# Patient Record
Sex: FEMALE
[De-identification: names, ages, dates, MRNs, and addresses within clinical notes are randomized per-mention and may not be internally consistent; named-entity substitution may affect disease eponyms.]

---

## 2017-03-25 ENCOUNTER — HOSPITAL ENCOUNTER (EMERGENCY)
Dept: HOSPITAL 14 - H.ER | Age: 37
Discharge: HOME | End: 2017-03-25
Payer: COMMERCIAL

## 2017-03-25 VITALS
OXYGEN SATURATION: 100 % | DIASTOLIC BLOOD PRESSURE: 86 MMHG | HEART RATE: 84 BPM | SYSTOLIC BLOOD PRESSURE: 117 MMHG | TEMPERATURE: 98 F | RESPIRATION RATE: 16 BRPM

## 2017-03-25 VITALS — BODY MASS INDEX: 28.3 KG/M2

## 2017-03-25 DIAGNOSIS — J02.9: Primary | ICD-10-CM

## 2017-03-25 NOTE — ED PDOC
HPI: CCC, URI, Sore Throat


Time Seen by Provider: 03/25/17 14:13


Chief Complaint (Nursing): ENT Problem


Chief Complaint (Provider): sore throat


History Per: Patient


History/Exam Limitations: no limitations


Have you had recent travel within the past 21 days to any of the following 

countries: [place], [place], [place] or [place]?: No


Onset/Duration Of Symptoms: Days (x 4)


Current Symptoms Are (Timing): Still Present


Associated Symptoms: Sore Throat.  denies: Fever, Chills, Cough, Sputum


Additional Complaint(s): 


Caitlin Pruitt is a 37 year old female, with no previous medical history, who 

presents to the ED with complaints of a sore throat ongoing for 4 days. Pt 

denies any cough, fever, body aches, chills, sick contact, sputum production or 

recent travel. Pt denies any additional complaints at this time. Pt is 

requesting to be testing for strep. 





PMD: none provided





Past Medical History


Vital Signs: 


 Last Vital Signs











Temp  98.0 F   03/25/17 14:06


 


Pulse  84   03/25/17 14:06


 


Resp  16   03/25/17 14:06


 


BP  117/86   03/25/17 14:06


 


Pulse Ox  100   03/25/17 14:21














- Family History


Family History: States: Unknown Family Hx





- Home Medications


Home Medications: 


 Ambulatory Orders











 Medication  Instructions  Recorded


 


Nitrofurantoin Macrocrystals 100 mg PO BID #14 cap 12/20/15





[Macrobid]  


 


Prenatal Vitamins6 [Prenatal 1 tab PO DAILY #30 tab 12/20/15





Vitamins]  


 


traMADol [Ultram] 50 - 100 mg PO Q6H #20 tab 05/19/16


 


Benzonatate [Tessalon Perles] 100 mg PO BID #20 sgl 03/25/17














- Allergies


Allergies/Adverse Reactions: 


 Allergies











Allergy/AdvReac Type Severity Reaction Status Date / Time


 


No Known Allergies Allergy   Verified 12/29/16 15:53














Review of Systems


ROS Statement: Except As Marked, All Systems Reviewed And Found Negative


Constitutional: Negative for: Fever, Chills, Other (body aches)


Respiratory: Negative for: Cough, Sputum





Physical Exam





- Reviewed


Nursing Documentation Reviewed: Yes


Vital Signs Reviewed: Yes





- Physical Exam


Appears: Positive for: Well, Non-toxic, No Acute Distress


Head Exam: Positive for: ATRAUMATIC, NORMAL INSPECTION, NORMOCEPHALIC


Skin: Positive for: Normal Color, Warm, DRY


ENT: Positive for: Pharyngeal Erythema, Tonsillar Swelling (moderate), Other (

cervical lymphadenopathy. no uvular deviation ).  Negative for: Tonsillar 

Exudate


Cardiovascular/Chest: Positive for: Regular Rate, Rhythm


Respiratory: Positive for: CNT, Normal Breath Sounds


Neurologic/Psych: Positive for: Alert, Oriented





- ECG


O2 Sat by Pulse Oximetry: 100 (RA)


Pulse Ox Interpretation: Normal





Medical Decision Making


Medical Decision Making: 


initial Impression: Viral vs Bacterial syndrome 





Initial Plan:


* rapid strep 


* reevaluation 


* 


* negative strep


* most likely viral -avi rodriguez for sore thoart





--------------------------------------------------------------------------------

----------------- 


Scribe Attestation:


Documented by Amber Chen, acting as a scribe for Marcelle Morgan PA-C.





Provider Scribe Attestation:


All medical record entries made by the Scribe were at my direction and 

personally dictated by me. I have reviewed the chart and agree that the record 

accurately reflects my personal performance of the history, physical exam, 

medical decision making, and the department course for this patient. I have 

also personally directed, reviewed, and agree with the discharge instructions 

and disposition.





Disposition





- Clinical Impression


Clinical Impression: 


 Sore throat (viral)





- Patient ED Disposition


Is Patient to be Admitted: No


Counseled Patient/Family Regarding: Studies Performed, Diagnosis, Need For 

Followup, Rx Given





- Disposition


Disposition: Routine/Home


Disposition Time: 15:04


Condition: STABLE


Prescriptions: 


Benzonatate [Tessalon Perles] 100 mg PO BID #20 sgl


Instructions:  Strep Throat (ED)

## 2018-02-07 ENCOUNTER — HOSPITAL ENCOUNTER (EMERGENCY)
Dept: HOSPITAL 14 - H.ER | Age: 38
Discharge: HOME | End: 2018-02-07
Payer: COMMERCIAL

## 2018-02-07 VITALS
SYSTOLIC BLOOD PRESSURE: 122 MMHG | OXYGEN SATURATION: 100 % | DIASTOLIC BLOOD PRESSURE: 81 MMHG | HEART RATE: 67 BPM | RESPIRATION RATE: 20 BRPM

## 2018-02-07 VITALS — TEMPERATURE: 98.8 F

## 2018-02-07 VITALS — BODY MASS INDEX: 29.2 KG/M2

## 2018-02-07 DIAGNOSIS — J02.9: ICD-10-CM

## 2018-02-07 DIAGNOSIS — N93.9: Primary | ICD-10-CM

## 2018-02-07 LAB
BASOPHILS # BLD AUTO: 0 K/UL (ref 0–0.2)
BASOPHILS NFR BLD: 0.4 % (ref 0–2)
EOSINOPHIL # BLD AUTO: 0.1 K/UL (ref 0–0.7)
EOSINOPHIL NFR BLD: 1.4 % (ref 0–4)
ERYTHROCYTE [DISTWIDTH] IN BLOOD BY AUTOMATED COUNT: 13 % (ref 11.5–14.5)
HGB BLD-MCNC: 11.9 G/DL (ref 12–16)
LYMPHOCYTES # BLD AUTO: 1.6 K/UL (ref 1–4.3)
LYMPHOCYTES NFR BLD AUTO: 22 % (ref 20–40)
MCH RBC QN AUTO: 27.4 PG (ref 27–31)
MCHC RBC AUTO-ENTMCNC: 33.1 G/DL (ref 33–37)
MCV RBC AUTO: 82.9 FL (ref 81–99)
MONOCYTES # BLD: 0.4 K/UL (ref 0–0.8)
MONOCYTES NFR BLD: 5.6 % (ref 0–10)
NEUTROPHILS # BLD: 5.1 K/UL (ref 1.8–7)
NEUTROPHILS NFR BLD AUTO: 70.6 % (ref 50–75)
NRBC BLD AUTO-RTO: 0.1 % (ref 0–0)
PLATELET # BLD: 177 K/UL (ref 130–400)
PMV BLD AUTO: 9.5 FL (ref 7.2–11.7)
RBC # BLD AUTO: 4.32 MIL/UL (ref 3.8–5.2)
WBC # BLD AUTO: 7.2 K/UL (ref 4.8–10.8)

## 2018-02-07 NOTE — ED PDOC
HPI: General Adult


Time Seen by Provider: 18 10:17


Chief Complaint (Nursing): Female Genitourinary


Chief Complaint (Provider): sore throat, vaginal bleeding


History Per: Patient


Additional Complaint(s): 


37-year-old female presents to emergency department with body aches and sore 

throat that started yesterday. Patient states multiple family members were 

diagnosed with flu recently.  Patient denies any fever. She also complains of 

persistent vaginal bleeding since 2018. Patient states she has her normal 

menses from 18 to 18 and then started to bleed again on 18 to 

presents.  Patient is on depo provera as of 9 months ago.  She denies concern 

for pregnancy. Patient states she's been taking Motrin for menstrual cramping 

which has helped.





PMD: Dr. Estes





Past Medical History


Reviewed: Historical Data, Nursing Documentation, Vital Signs


Vital Signs: 


 Last Vital Signs











Temp  98.8 F   18 10:17


 


Pulse  70   18 10:17


 


Resp  16   18 10:17


 


BP  154/95 H  18 10:17


 


Pulse Ox  99   18 12:27














- Medical History


PMH: No Chronic Diseases





- Surgical History


Other surgeries: left knee surgery





- Family History


Family History: States: No Known Family Hx





- Living Arrangements


Living Arrangements: With Family





- Social History


Current smoker - smoking cessation education provided: No


Ex-Smoker (has not smoked in the last 12 months): Yes


Alcohol: Social


Drugs: Denies





- Home Medications


Home Medications: 


 Ambulatory Orders











 Medication  Instructions  Recorded


 


Nitrofurantoin Macrocrystals 100 mg PO BID #14 cap 12/20/15





[Macrobid]  


 


Prenatal Vitamins6 [Prenatal 1 tab PO DAILY #30 tab 12/20/15





Vitamins]  


 


traMADol [Ultram] 50 - 100 mg PO Q6H #20 tab 16


 


Benzonatate [Tessalon Perles] 100 mg PO BID #20 sgl 17


 


Ibuprofen [Motrin] 600 mg PO TID 7 Days  tab 10/27/17


 


Nitrofurantoin Macrocrystals 100 mg PO BID #10 cap 10/27/17





[Macrobid]  














- Allergies


Allergies/Adverse Reactions: 


 Allergies











Allergy/AdvReac Type Severity Reaction Status Date / Time


 


No Known Allergies Allergy   Verified 16 15:53














Review of Systems


ROS Statement: Except As Marked, All Systems Reviewed And Found Negative


Constitutional: Positive for: Other (body aches).  Negative for: Fever


ENT: Positive for: Throat Pain


Cardiovascular: Negative for: Chest Pain


Respiratory: Negative for: Cough


Gastrointestinal: Positive for: Abdominal Pain (cramping).  Negative for: Nausea

, Vomiting, Diarrhea


Genitourinary Female: Positive for: Vaginal Bleeding.  Negative for: Dysuria





Physical Exam





- Reviewed


Nursing Documentation Reviewed: Yes


Vital Signs Reviewed: Yes





- Physical Exam


Appears: Positive for: Well, Non-toxic, No Acute Distress


Skin: Negative for: Rash


Eye Exam: Positive for: Normal appearance


Cardiovascular/Chest: Positive for: Regular Rate, Rhythm


Respiratory: Positive for: Normal Breath Sounds


Gastrointestinal/Abdominal: Positive for: Soft.  Negative for: Tenderness, 

Distended, Guarding, Rebound


Back: Negative for: L CVA Tenderness, R CVA Tenderness


Extremity: Positive for: Normal ROM


Neurologic/Psych: Positive for: Alert, Oriented





- Laboratory Results


Result Diagrams: 


 18 11:45





Urine Pregnancy POC: Negative


Urine dip results: Positive for: Blood (large), Protein.  Negative for: 

Leukocyte Esterase, Nitrate, Ketones, Glucose, Bilirubin





- ECG


O2 Sat by Pulse Oximetry: 99


Pulse Ox Interpretation: Normal





Medical Decision Making


Medical Decision Makin year old with flu like symptoms and vaginal bleeding





Plan:


Flu swab


PO tylenol


Urine dip and pregnancy test





Flu and strep are negative


Advised NSAID's prn sore throat and cramps and follow up with ob/gyn.





Disposition





- Clinical Impression


Clinical Impression: 


 Sore throat, Vaginal bleeding








- Patient ED Disposition


Is Patient to be Admitted: No


Counseled Patient/Family Regarding: Studies Performed, Diagnosis, Need For 

Followup





- Disposition


Referrals: 


Tommy Hua MD [Staff Provider] - 


Disposition: Routine/Home


Disposition Time: 12:59


Condition: STABLE


Additional Instructions: 


Motrin as needed for pain.


Follow up in 1-2 days with gyn.


Instructions:  Dysfunctional Uterine Bleeding (ED), Pharyngitis (ED)


Forms:  [facility] (English)





Results





- Lab Results


Lab Results: 














  18





  11:45 11:45 11:45


 


WBC    7.2


 


RBC    4.32


 


Hgb    11.9 L


 


Hct    35.8


 


MCV    82.9


 


MCH    27.4


 


MCHC    33.1


 


RDW    13.0


 


Plt Count    177


 


MPV    9.5


 


Neut % (Auto)    70.6


 


Lymph % (Auto)    22.0


 


Mono % (Auto)    5.6


 


Eos % (Auto)    1.4


 


Baso % (Auto)    0.4


 


Neut # (Auto)    5.1


 


Lymph # (Auto)    1.6


 


Mono # (Auto)    0.4


 


Eos # (Auto)    0.1


 


Baso # (Auto)    0.0


 


Influenza Typ A,B (EIA)   Negative for flu a/b 


 


Grp A Beta Strep Ag  Negative

## 2018-02-20 ENCOUNTER — HOSPITAL ENCOUNTER (EMERGENCY)
Dept: HOSPITAL 14 - H.ER | Age: 38
Discharge: HOME | End: 2018-02-20
Payer: COMMERCIAL

## 2018-02-20 VITALS
OXYGEN SATURATION: 100 % | HEART RATE: 88 BPM | DIASTOLIC BLOOD PRESSURE: 83 MMHG | SYSTOLIC BLOOD PRESSURE: 133 MMHG | RESPIRATION RATE: 20 BRPM | TEMPERATURE: 99.8 F

## 2018-02-20 VITALS — BODY MASS INDEX: 29.2 KG/M2

## 2018-02-20 DIAGNOSIS — J11.1: Primary | ICD-10-CM

## 2018-02-20 NOTE — ED PDOC
HPI: CCC, URI, Sore Throat


Time Seen by Provider: 02/20/18 18:06


Chief Complaint (Nursing): Cough, Cold, Congestion


Chief Complaint (Provider): Cough, congestion


History Per: Patient


History/Exam Limitations: no limitations


Have you had recent travel within the past 21 days to any of the following 

countries: [place], [place], [place] or [place]?: No


Onset/Duration Of Symptoms: Days


Current Symptoms Are (Timing): Still Present


Additional History Per: Patient


Additional Complaint(s): 


38yo female, presents to ED with complaints of a sore throat for the past 3 

days and a cough since yesterday. She states since this morning, she also has 

had chills, bodyaches and headache. Patient states she has a fever as well with 

a Tmax of 100.8 at home. She offers no other medical complaints. 





Past Medical History


Reviewed: Historical Data, Nursing Documentation, Vital Signs


Vital Signs: 


 Last Vital Signs











Temp  99.8 F H  02/20/18 17:16


 


Pulse  88   02/20/18 17:16


 


Resp  20   02/20/18 17:16


 


BP  133/83   02/20/18 17:16


 


Pulse Ox  100   02/20/18 18:39














- Medical History


PMH: No Chronic Diseases





- Surgical History


Surgical History: No Surg Hx





- Family History


Family History: States: Unknown Family Hx





- Home Medications


Home Medications: 


 Ambulatory Orders











 Medication  Instructions  Recorded


 


Nitrofurantoin Macrocrystals 100 mg PO BID #14 cap 12/20/15





[Macrobid]  


 


Prenatal Vitamins6 [Prenatal 1 tab PO DAILY #30 tab 12/20/15





Vitamins]  


 


traMADol [Ultram] 50 - 100 mg PO Q6H #20 tab 05/19/16


 


Benzonatate [Tessalon Perles] 100 mg PO BID #20 sgl 03/25/17


 


Ibuprofen [Motrin] 600 mg PO TID 7 Days  tab 10/27/17


 


Nitrofurantoin Macrocrystals 100 mg PO BID #10 cap 10/27/17





[Macrobid]  


 


Oseltamivir [Tamiflu] 75 mg PO BID #10 cap 02/20/18


 


Prednisone 50 mg PO ONCE #1 tablet 02/20/18














- Allergies


Allergies/Adverse Reactions: 


 Allergies











Allergy/AdvReac Type Severity Reaction Status Date / Time


 


No Known Allergies Allergy   Verified 02/20/18 17:15














Review of Systems


ROS Statement: Except As Marked, All Systems Reviewed And Found Negative


Constitutional: Positive for: Fever, Chills, Malaise


ENT: Positive for: Nose Congestion, Throat Pain


Respiratory: Positive for: Cough


Neurological: Positive for: Headache





Physical Exam





- Reviewed


Nursing Documentation Reviewed: Yes


Vital Signs Reviewed: Yes





- Physical Exam


Appears: Positive for: Non-toxic, No Acute Distress


Head Exam: Positive for: ATRAUMATIC


Skin: Positive for: Normal Color


Eye Exam: Positive for: Normal appearance


ENT: Positive for: Normal ENT Inspection.  Negative for: Pharyngeal Erythema, 

Tonsillar Exudate, Tonsillar Swelling


Neck: Positive for: Painless ROM, Supple


Cardiovascular/Chest: Positive for: Regular Rate, Rhythm


Respiratory: Positive for: Normal Breath Sounds.  Negative for: Wheezing


Neurologic/Psych: Positive for: Alert, Oriented.  Negative for: Motor/Sensory 

Deficits





- ECG


O2 Sat by Pulse Oximetry: 100 (RA)


Pulse Ox Interpretation: Normal





Medical Decision Making


Medical Decision Making: 


Plan:


-- Patient to be treated for influenza, given prescriptions for Tamiflu and 

prednisone. Patient instructed to take medication as instructed, keep well 

hydrated and to follow up with PCP in 1-2 days. 





--------------------------------------------------------------------------------

-----------------


Scribe Attestation:   


Documented by Vanessa Calderón, acting as a scribe for Lakisha Rhodes PA-C





Provider Scribe Attestation:


All medical record entries made by the Scribe were at my direction and 

personally dictated by me. I have reviewed the chart and agree that the record 

accurately reflects my personal performance of the history, physical exam, 

medical decision making, and the department course for this patient. I have 

also personally directed, reviewed, and agree with the discharge instructions 

and disposition.





Disposition





- Clinical Impression


Clinical Impression: 


 Influenza








- Patient ED Disposition


Is Patient to be Admitted: No


Counseled Patient/Family Regarding: Diagnosis, Need For Followup, Rx Given





- Disposition


Disposition: Routine/Home


Disposition Time: 18:38


Condition: GOOD


Prescriptions: 


Oseltamivir [Tamiflu] 75 mg PO BID #10 cap


Prednisone 50 mg PO ONCE #1 tablet


Instructions:  Flu, Adult (DC)


Forms:  [facility] (English)

## 2018-03-23 ENCOUNTER — HOSPITAL ENCOUNTER (OUTPATIENT)
Dept: HOSPITAL 14 - H.OPSURG | Age: 38
Discharge: HOME | End: 2018-03-23
Attending: SPECIALIST
Payer: COMMERCIAL

## 2018-03-23 VITALS — RESPIRATION RATE: 18 BRPM

## 2018-03-23 VITALS
SYSTOLIC BLOOD PRESSURE: 122 MMHG | DIASTOLIC BLOOD PRESSURE: 85 MMHG | OXYGEN SATURATION: 100 % | TEMPERATURE: 98 F | HEART RATE: 80 BPM

## 2018-03-23 VITALS — BODY MASS INDEX: 30 KG/M2

## 2018-03-23 DIAGNOSIS — N80.0: Primary | ICD-10-CM

## 2018-03-23 DIAGNOSIS — Z87.891: ICD-10-CM

## 2018-03-23 DIAGNOSIS — E66.9: ICD-10-CM

## 2018-03-23 DIAGNOSIS — R10.2: ICD-10-CM

## 2018-03-23 LAB
APTT BLD: 31.2 SECONDS (ref 25.6–37.1)
ERYTHROCYTE [DISTWIDTH] IN BLOOD BY AUTOMATED COUNT: 13.6 % (ref 11.5–14.5)
HGB BLD-MCNC: 12.3 G/DL (ref 12–16)
INR PPP: 1 (ref 0.9–1.2)
MCH RBC QN AUTO: 26.7 PG (ref 27–31)
MCHC RBC AUTO-ENTMCNC: 32.2 G/DL (ref 33–37)
MCV RBC AUTO: 82.9 FL (ref 81–99)
PLATELET # BLD: 187 K/UL (ref 130–400)
PROTHROMBIN TIME: 11.6 SECONDS (ref 9.8–13.1)
RBC # BLD AUTO: 4.61 MIL/UL (ref 3.8–5.2)
WBC # BLD AUTO: 7.2 K/UL (ref 4.8–10.8)

## 2018-03-23 PROCEDURE — 36415 COLL VENOUS BLD VENIPUNCTURE: CPT

## 2018-03-23 PROCEDURE — 85027 COMPLETE CBC AUTOMATED: CPT

## 2018-03-23 PROCEDURE — 49320 DIAG LAPARO SEPARATE PROC: CPT

## 2018-03-23 PROCEDURE — 85610 PROTHROMBIN TIME: CPT

## 2018-03-23 PROCEDURE — 86850 RBC ANTIBODY SCREEN: CPT

## 2018-03-23 PROCEDURE — 85730 THROMBOPLASTIN TIME PARTIAL: CPT

## 2018-03-23 PROCEDURE — 86900 BLOOD TYPING SEROLOGIC ABO: CPT

## 2018-03-23 RX ADMIN — HYDROMORPHONE HYDROCHLORIDE PRN MG: 1 INJECTION, SOLUTION INTRAMUSCULAR; INTRAVENOUS; SUBCUTANEOUS at 14:00

## 2018-03-23 RX ADMIN — HYDROMORPHONE HYDROCHLORIDE PRN MG: 1 INJECTION, SOLUTION INTRAMUSCULAR; INTRAVENOUS; SUBCUTANEOUS at 13:45

## 2018-03-28 NOTE — OP
PROCEDURE DATE:  03/23/2018



PREOPERATIVE DIAGNOSIS:  Pelvic pain.



POSTOPERATIVE DIAGNOSES:  Pelvic pain plus possible endometriosis and _____

adenomyosis with the uterus being mottled.



SURGEON:  Tommy Hua MD



ASSISTANT:  Nikunj Plaza MD



ANESTHESIA ADMINISTERED BY:  Dr. Gaffney.



TYPE OF ANESTHESIA:  General anesthesia.



PROCEDURE:  Diagnostic laparoscopy on the above findings, possible

endometriosis and _____ adenomyosis.



ESTIMATED BLOOD LOSS:  Minimal.



DESCRIPTION OF PROCEDURE:  With the patient in the dorsal lithotomy

position under general anesthesia, the patient was prepped and draped in

the usual sterile manner.  A weighted speculum was placed in the posterior

vagina.  Bladder was entered with straight cath after which the anterior

cervix was grasped with single-tooth tenaculum and dilated.  A HUMI uterine

manipulator was put into place.  We moved to the abdomen where the abdomen

was tented.  Near the umbilicus, Veress needle introduced inflating the

abdomen to about 4 L of CO2.  After this was done, 1 cm incision was made

below the umbilicus, #10 trocar was introduced.  Following this, inflating

the abdomen to about 4 liters, #5 trocar was put into place to the left of

the midline and uterus, tubes and ovaries are visualized.  Uterus appeared

mottled and the left and right tubes are within jhon limits, ovaries are

grossly normal, what appeared to be some area, which appeared to be

endometriotic.  After visualizing this since, the instruments were removed

from the vagina and incisions were closed without any complications with

2-0 chromic and Dermabond.  Instruments were then removed from the vagina

and the abdomen.

























The patient tolerated the procedure well and was in satisfactory condition

and went to the recovery room.  Blood loss was minimal.





__________________________________________

Tommy Hua MD





DD:  03/28/2018 13:31:14

DT:  03/28/2018 14:02:07

Job # [id number]

## 2018-04-27 ENCOUNTER — HOSPITAL ENCOUNTER (OUTPATIENT)
Dept: HOSPITAL 14 - H.OPSURG | Age: 38
Discharge: HOME | End: 2018-04-27
Attending: SPECIALIST
Payer: COMMERCIAL

## 2018-04-27 VITALS — OXYGEN SATURATION: 99 %

## 2018-04-27 VITALS — HEART RATE: 76 BPM | SYSTOLIC BLOOD PRESSURE: 110 MMHG | DIASTOLIC BLOOD PRESSURE: 67 MMHG | TEMPERATURE: 97.9 F

## 2018-04-27 VITALS — BODY MASS INDEX: 29.9 KG/M2

## 2018-04-27 VITALS — RESPIRATION RATE: 18 BRPM

## 2018-04-27 DIAGNOSIS — Z30.2: Primary | ICD-10-CM

## 2018-04-27 DIAGNOSIS — Z64.1: ICD-10-CM

## 2018-04-27 LAB
ERYTHROCYTE [DISTWIDTH] IN BLOOD BY AUTOMATED COUNT: 13.7 % (ref 11.5–14.5)
HGB BLD-MCNC: 12.8 G/DL (ref 12–16)
MCH RBC QN AUTO: 26.2 PG (ref 27–31)
MCHC RBC AUTO-ENTMCNC: 32.5 G/DL (ref 33–37)
MCV RBC AUTO: 80.6 FL (ref 81–99)
PLATELET # BLD: 181 K/UL (ref 130–400)
RBC # BLD AUTO: 4.9 MIL/UL (ref 3.8–5.2)
WBC # BLD AUTO: 7.1 K/UL (ref 4.8–10.8)

## 2018-04-27 PROCEDURE — 86900 BLOOD TYPING SEROLOGIC ABO: CPT

## 2018-04-27 PROCEDURE — 85027 COMPLETE CBC AUTOMATED: CPT

## 2018-04-27 PROCEDURE — 58670 LAPAROSCOPY TUBAL CAUTERY: CPT

## 2018-04-27 PROCEDURE — 86850 RBC ANTIBODY SCREEN: CPT

## 2018-04-27 PROCEDURE — 36415 COLL VENOUS BLD VENIPUNCTURE: CPT

## 2018-05-06 NOTE — OP
PROCEDURE DATE:  04/27/2018



PROCEDURE: Laparoscopic bilateral tubal ligation with cautery



PREOPERATIVE DIAGNOSIS:  Multiparity.



POSTOPERATIVE DIAGNOSIS:  Multiparity.



SURGEON:  Tommy Hua MD



ANESTHESIA ADMINISTERED BY:  Javid Griffith MD



TYPE OF ANESTHESIA:  General anesthesia.



DESCRIPTION OF PROCEDURE:   The patient was prepped and draped in usual

sterile manner.  Following this, a straight cath was used to enter the

bladder.  After this was done, cervix was grasped anteriorly with

single-tooth tenaculum dilated and HUMI uterine manipulator was put into

place and moved to the abdomen where the abdomen was tented and a Veress

needle introduced, about 4 L of CO2 was inflated.  Following this, a small

incision was made below the umbilicus and #10 trocar was introduced,

leaving the sleeve in place.  The laparoscope with camera attached was then

introduced to the abdominopelvic cavity, visualizing the cervix,

visualizing the tubes, ovaries and uterus normal in shape.  After this was

done, #5 trocar was introduced in the midline just 2 cm above the pubic

bone.  After this was done, both tubes were cauterized bilaterally

extensively and maintaining hemostasis.  Following this, the abdomen was

deflated.  All the instruments were removed from the abdominal cavity.  The

incisions were closed with 1 Vicryl and Dermabond maintaining hemostasis. 

After this was done, the instruments were also removed from the vagina

without any complication.  The patient tolerated the procedure well and was

in satisfactory condition on way to recovery room.





__________________________________________

Tommy Hua MD



DD:  05/05/2018 15:09:44

DT:  05/05/2018 16:26:25

Job # [id number]

MTDD

## 2018-08-02 ENCOUNTER — HOSPITAL ENCOUNTER (EMERGENCY)
Dept: HOSPITAL 14 - H.ER | Age: 38
Discharge: HOME | End: 2018-08-02
Payer: MEDICAID

## 2018-08-02 VITALS
TEMPERATURE: 98 F | RESPIRATION RATE: 18 BRPM | HEART RATE: 76 BPM | DIASTOLIC BLOOD PRESSURE: 67 MMHG | SYSTOLIC BLOOD PRESSURE: 122 MMHG

## 2018-08-02 VITALS — OXYGEN SATURATION: 98 %

## 2018-08-02 VITALS — BODY MASS INDEX: 29.9 KG/M2

## 2018-08-02 DIAGNOSIS — L50.0: Primary | ICD-10-CM

## 2018-08-02 PROCEDURE — 96375 TX/PRO/DX INJ NEW DRUG ADDON: CPT

## 2018-08-02 PROCEDURE — 96374 THER/PROPH/DIAG INJ IV PUSH: CPT

## 2018-08-02 PROCEDURE — 81025 URINE PREGNANCY TEST: CPT

## 2018-08-02 PROCEDURE — 99283 EMERGENCY DEPT VISIT LOW MDM: CPT

## 2018-08-02 RX ADMIN — DIPHENHYDRAMINE HYDROCHLORIDE STA MG: 50 INJECTION INTRAMUSCULAR; INTRAVENOUS at 11:09

## 2018-08-02 NOTE — ED PDOC
HPI: Allergic Reaction


Time Seen by Provider: 08/02/18 10:29


Chief Complaint (Nursing): Abnormal Skin Integrity


Chief Complaint (Provider): Abnormal Skin Integrity


History Per: Patient


History/Exam Limitations: no limitations


Onset/Duration Of Symptoms: Hrs


Additional Complaint(s): 


38 years old female presents to the ED for evaluation of diffused red itchy 

rash over her body onset this morning. Patient reports she had no new 

medication or food or change in slopes and motions. Otherwise patient reports (-

) throat swelling, (-) tongue / lip swelling, (-) dyspnea, (-) cough, (-) 

wheezing, (-) abdominal pain, (-) nausea (-) vomiting.  There has been no 

exposure to known allergens. 








PMD: Daniel Bauer





Past Medical History


Reviewed: Historical Data, Nursing Documentation, Vital Signs


Vital Signs: 


 Last Vital Signs











Temp  98.3 F   08/02/18 10:22


 


Pulse  68   08/02/18 10:22


 


Resp  19   08/02/18 10:22


 


BP  124/85   08/02/18 10:22


 


Pulse Ox  98   08/02/18 10:22














- Medical History


PMH: No Chronic Diseases


   Denies: Chronic Kidney Disease





- Surgical History


Surgical History: No Surg Hx





- Family History


Family History: States: Unknown Family Hx





- Social History


Current smoker - smoking cessation education provided: No


Alcohol: Social


Drugs: Denies





- Home Medications


Home Medications: 


 Ambulatory Orders











 Medication  Instructions  Recorded


 


Ibuprofen [Motrin Tab] 800 mg PO Q8 PRN 04/27/18


 


Nitrofurantoin Macrocrystals 100 mg PO BID #14 cap 05/14/18





[Macrobid]  


 


DiphenhydrAMINE [Benadryl] 50 mg PO TID PRN #30 cap 08/02/18


 


Famotidine [Pepcid] 40 mg PO DAILY #20 tablet 08/02/18


 


predniSONE [predniSONE Tab] 40 mg PO DAILY #8 tab 08/02/18














- Allergies


Allergies/Adverse Reactions: 


 Allergies











Allergy/AdvReac Type Severity Reaction Status Date / Time


 


No Known Allergies Allergy   Verified 08/02/18 10:30














Review of Systems


ROS Statement: Except As Marked, All Systems Reviewed And Found Negative


Skin: Positive for: Rash





Physical Exam





- Physical Exam


Comments: 





GENERAL APPEARANCE: Patient is awake, alert, oriented x 3, in no acute distress


SKIN:  Several erythematous urticarial rash to trunk and lower extremity. 

Otherwise (-) excoriations, (-) drainage, (-) crusting of lesions is present. 


HENT: (-) conjunctival injection, (-) chemosis. Oropharynx: clear (-) tongue or 

lip swelling, (-) tonsillar exudates, (-) erythema. Airway: patent (-) stridor, 

(-) hoarseness.  Mucous membranes moist. Nares: Patent (-) rhinorrhea.


NECK: (-) lymphadenopathy, (-) tenderness. 


CARDIOVASCULAR: Normal rate and rhythm.  (-) murmur, (-) gallop. 


CHEST: (-) rales, (-) wheezing, (-) dyspnea, (-) stridor. Breath sounds equal 

bilaterally.


ABDOMEN: Soft. (-) tenderness, (-) distention, (-) HSM. 


NEURO: Mental status: Patient is alert, oriented, and with normal strength and 

tone.





- ECG


O2 Sat by Pulse Oximetry: 98 (RA)


Pulse Ox Interpretation: Normal





- Progress


ED Course And Treament: 





Patient medicated with Solu-Medrol 125 mg IV, Benadryl 50 mg IV and Pepcid 20 

mg IV.





On reevaluation, patient is awake, alert and oriented 3 in no acute distress, 

breathing easy and unlabored. On exam there is noted improvement of the rash.





Advised to follow up with primary care physician in 1-2 days without fail. 

Advised to take medication as prescribed. Return to the emergency room at any 

time for any new or worsening symptoms.





Patient states she fully agrees with and understands discharge instructions. 

States that she agrees with the plan and disposition. Verbalized and repeated 

discharge instructions and plan. I have given the patient opportunity to ask 

any additional questions.





Disposition





- Clinical Impression


Clinical Impression: 


 Urticaria








- Patient ED Disposition


Is Patient to be Admitted: No


Counseled Patient/Family Regarding: Diagnosis, Need For Followup, Rx Given





- Disposition


Disposition: Routine/Home


Disposition Time: 11:50


Condition: STABLE


Additional Instructions: 


Thank you for letting us take care of you today. You were treated for 

urticaria. The emergency medical care you received today was directed at your 

acute symptoms. If you were prescribed any medication, please fill it and take 

as directed. It may take several days for your symptoms to resolve. Return to 

the Emergency Department if your symptoms worsen, do not improve, or if you 

have any other problems.





Please contact your doctor in 2 days for re-evaluation and follow up. Bring any 

paperwork you were given at discharge with you along with any medications you 

are taking to your follow up visit. Our treatment cannot replace ongoing 

medical care by a primary care provider (PCP) outside of the emergency 

department.





Thank you for allowing the [facility] team to be part of your care today.








Prescriptions: 


DiphenhydrAMINE [Benadryl] 50 mg PO TID PRN #30 cap


 PRN Reason: Allergy Symptoms


Famotidine [Pepcid] 40 mg PO DAILY #20 tablet


predniSONE [predniSONE Tab] 40 mg PO DAILY #8 tab


Instructions:  Hives (DC)


Forms:  [email or web address] (English)





Medical Decision Making


Medical Decision Making: 





Time: 1054


Initial Impression: 


Initial Plan:


--Benadryl 50 mg IVP


--Pepid 20 mg IVP


--Medrol





Upon reevaluation patient is alert, awake and oriented x3. Patient is in no 

acute distress, breathing easy and unlabored with no improvement of rash. 

Advised to follow up with primary care physician in 1-2 days without fail. 

Advised to take medication as prescribed. Return to the emergency room at any 

time for any new or worsening symptoms.





Patient states she fully agrees with and understands discharge instructions. 

States that she agrees with the plan and disposition. Verbalized and repeated 

discharge instructions and plan. I have given the patient opportunity to ask 

any additional questions.





--------------------------------------------------------------------------------

-----


Scribe Attestation:


Documented by Madiha Alfaro, acting as a scribe for Marine Mcguire PA-C.





Provider Scribe Attestation:


All medical record entries made by the Scribe were at my direction and 

personally dictated by me. I have reviewed the chart and agree that the record 

accurately reflects my personal performance of the history, physical exam, 

medical decision making, and the department course for this patient. I have 

also personally directed, reviewed, and agree with the discharge instructions 

and disposition.

## 2018-08-08 ENCOUNTER — HOSPITAL ENCOUNTER (OUTPATIENT)
Dept: HOSPITAL 14 - H.ER | Age: 38
Setting detail: OBSERVATION
LOS: 1 days | Discharge: HOME | End: 2018-08-09
Attending: FAMILY MEDICINE | Admitting: FAMILY MEDICINE
Payer: MEDICAID

## 2018-08-08 VITALS — BODY MASS INDEX: 29.9 KG/M2

## 2018-08-08 DIAGNOSIS — R20.0: ICD-10-CM

## 2018-08-08 DIAGNOSIS — R42: Primary | ICD-10-CM

## 2018-08-08 DIAGNOSIS — Z98.51: ICD-10-CM

## 2018-08-08 DIAGNOSIS — R51: ICD-10-CM

## 2018-08-08 PROCEDURE — 93005 ELECTROCARDIOGRAM TRACING: CPT

## 2018-08-08 PROCEDURE — 81003 URINALYSIS AUTO W/O SCOPE: CPT

## 2018-08-08 PROCEDURE — 81025 URINE PREGNANCY TEST: CPT

## 2018-08-08 PROCEDURE — 70450 CT HEAD/BRAIN W/O DYE: CPT

## 2018-08-08 PROCEDURE — 71045 X-RAY EXAM CHEST 1 VIEW: CPT

## 2018-08-08 PROCEDURE — 80053 COMPREHEN METABOLIC PANEL: CPT

## 2018-08-08 PROCEDURE — 85025 COMPLETE CBC W/AUTO DIFF WBC: CPT

## 2018-08-08 PROCEDURE — 70551 MRI BRAIN STEM W/O DYE: CPT

## 2018-08-08 PROCEDURE — 70544 MR ANGIOGRAPHY HEAD W/O DYE: CPT

## 2018-08-08 PROCEDURE — 99285 EMERGENCY DEPT VISIT HI MDM: CPT

## 2018-08-09 VITALS
OXYGEN SATURATION: 100 % | RESPIRATION RATE: 15 BRPM | SYSTOLIC BLOOD PRESSURE: 129 MMHG | DIASTOLIC BLOOD PRESSURE: 82 MMHG | HEART RATE: 78 BPM

## 2018-08-09 VITALS — TEMPERATURE: 98.2 F

## 2018-08-09 LAB
ALBUMIN SERPL-MCNC: 4.1 G/DL (ref 3.5–5)
ALBUMIN/GLOB SERPL: 1.3 {RATIO} (ref 1–2.1)
ALT SERPL-CCNC: 24 U/L (ref 9–52)
AST SERPL-CCNC: 25 U/L (ref 14–36)
BASOPHILS # BLD AUTO: 0.1 K/UL (ref 0–0.2)
BASOPHILS NFR BLD: 0.8 % (ref 0–2)
BILIRUB UR-MCNC: NEGATIVE MG/DL
BUN SERPL-MCNC: 11 MG/DL (ref 7–17)
CALCIUM SERPL-MCNC: 8.8 MG/DL (ref 8.4–10.2)
COLOR UR: YELLOW
EOSINOPHIL # BLD AUTO: 0.1 K/UL (ref 0–0.7)
EOSINOPHIL NFR BLD: 0.4 % (ref 0–4)
ERYTHROCYTE [DISTWIDTH] IN BLOOD BY AUTOMATED COUNT: 14.9 % (ref 11.5–14.5)
GFR NON-AFRICAN AMERICAN: > 60
GLUCOSE UR STRIP-MCNC: (no result) MG/DL
HGB BLD-MCNC: 12.8 G/DL (ref 12–16)
LEUKOCYTE ESTERASE UR-ACNC: (no result) LEU/UL
LYMPHOCYTES # BLD AUTO: 2.7 K/UL (ref 1–4.3)
LYMPHOCYTES NFR BLD AUTO: 17.9 % (ref 20–40)
MCH RBC QN AUTO: 26.9 PG (ref 27–31)
MCHC RBC AUTO-ENTMCNC: 32.6 G/DL (ref 33–37)
MCV RBC AUTO: 82.5 FL (ref 81–99)
MONOCYTES # BLD: 1 K/UL (ref 0–0.8)
MONOCYTES NFR BLD: 6.4 % (ref 0–10)
NEUTROPHILS # BLD: 11.4 K/UL (ref 1.8–7)
NEUTROPHILS NFR BLD AUTO: 74.5 % (ref 50–75)
NRBC BLD AUTO-RTO: 0 % (ref 0–0)
PH UR STRIP: 6 [PH] (ref 5–8)
PLATELET # BLD: 209 K/UL (ref 130–400)
PMV BLD AUTO: 9.3 FL (ref 7.2–11.7)
PROT UR STRIP-MCNC: 30 MG/DL
RBC # BLD AUTO: 4.77 MIL/UL (ref 3.8–5.2)
RBC # UR STRIP: (no result) /UL
SP GR UR STRIP: 1.02 (ref 1–1.03)
URINE CLARITY: (no result)
UROBILINOGEN UR-MCNC: 4 MG/DL (ref 0.2–1)
WBC # BLD AUTO: 15.3 K/UL (ref 4.8–10.8)

## 2018-08-09 NOTE — RAD
Date of service: 



08/09/2018



HISTORY:

admit  



COMPARISON:

07/10/2009. 



FINDINGS:



LUNGS:

The lungs are well inflated and clear. 



PLEURA:

No significant pleural effusion identified, no pneumothorax apparent.



CARDIOVASCULAR:

Normal.



OSSEOUS STRUCTURES:

No significant abnormalities.



VISUALIZED UPPER ABDOMEN:

Normal.



OTHER FINDINGS:

None.



IMPRESSION:

No active pulmonary disease.

## 2018-08-09 NOTE — ED PDOC
HPI: General Adult


Time Seen by Provider: 08/09/18 01:19


Chief Complaint (Nursing): Dizziness/Lightheaded


Chief Complaint (Provider): dizziness


History Per: Patient


History/Exam Limitations: no limitations


Onset/Duration Of Symptoms: Hrs (7)


Current Symptoms Are (Timing): Still Present


Additional Complaint(s): 





39 y/o female presents for evaluation of dizziness x 7 hours.  Patient states 

she noted mild dizziness when she woke up yesterday morning, but reports as of 

19:00 last night the dizziness became more intense and constant, describes as 

feeling "off balance".  Patient states 3 hours later she noted numbness to 

right side of face and blurred vision bilaterally, worse with right eye, and 

tingling to both hands.  Patient denies headache, nausea/vomiting, chest pain, 

shortness of breath, palpitations, changes in bowel movements, urinary 

symptoms.  





NIHSS Stroke Scale





- Date/Time Evaluation Performed


Date Performed: 08/09/18


Time Performed: 01:20


When Was NIHSS Performed: Baseline





- How Severe is the Stroke


Level of Consciousness: 0=Alert


LOC to Questions: 0=Both comments correct


LOC to commands: 0=Obeys both correctly


Best Gaze: 0=Normal


Visual: 0=No visual loss


Facial: 0=Normal


Motor Arm - Left: 0=No drift


Motor Arm - Right: 0=No drift


Motor Leg - Left: 0=No drift


Motor Leg - Right: 0=No drift


Limb Ataxia: 0=Absent


Sensory: 1=Mild to moderate loss


Best Language: 0=No aphasia


Dysarthia: 0=Normal articulation


Extinction & Inattention (Neglect): 0=Normal, no object


Score: 1





Past Medical History


Reviewed: Historical Data, Nursing Documentation, Vital Signs


Vital Signs: 


 Last Vital Signs











Temp  98.6 F   08/09/18 00:03


 


Pulse  81   08/09/18 00:03


 


Resp  17   08/09/18 00:03


 


BP  146/96 H  08/09/18 00:03


 


Pulse Ox  98   08/09/18 05:55














- Medical History


PMH: No Chronic Diseases


   Denies: Chronic Kidney Disease





- Family History


Family History: States: Unknown Family Hx





- Living Arrangements


Living Arrangements: With Family





- Social History


Current smoker - smoking cessation education provided: No


Alcohol: Social


Drugs: Denies





- Home Medications


Home Medications: 


 Ambulatory Orders











 Medication  Instructions  Recorded


 


Ibuprofen [Motrin Tab] 800 mg PO Q8 PRN 04/27/18


 


Nitrofurantoin Macrocrystals 100 mg PO BID #14 cap 05/14/18





[Macrobid]  


 


DiphenhydrAMINE [Benadryl] 50 mg PO TID PRN #30 cap 08/02/18


 


Famotidine [Pepcid] 40 mg PO DAILY #20 tablet 08/02/18


 


predniSONE [predniSONE Tab] 40 mg PO DAILY #8 tab 08/02/18














- Allergies


Allergies/Adverse Reactions: 


 Allergies











Allergy/AdvReac Type Severity Reaction Status Date / Time


 


No Known Allergies Allergy   Verified 08/02/18 10:30














Review of Systems


ROS Statement: Except As Marked, All Systems Reviewed And Found Negative


Neurological: Positive for: Numbness, Dizziness





Physical Exam





- Reviewed


Nursing Documentation Reviewed: Yes


Vital Signs Reviewed: Yes





- Physical Exam


Appears: Positive for: Well, Non-toxic, No Acute Distress


Head Exam: Positive for: ATRAUMATIC, NORMAL INSPECTION, NORMOCEPHALIC


Skin: Positive for: Normal Color


Eye Exam: Positive for: Normal appearance, EOMI, PERRL, Nystagmus (horizontal b/

l)


ENT: Positive for: Normal ENT Inspection


Cardiovascular/Chest: Positive for: Regular Rate, Rhythm


Respiratory: Positive for: Normal Breath Sounds


Gastrointestinal/Abdominal: Positive for: Normal Exam


Back: Positive for: Normal Inspection


Extremity: Positive for: Normal ROM


Neurologic/Psych: Positive for: Alert, Oriented (x3), Motor/Sensory Deficits (

decreased sensation right face, RUE, RLE compared to left).  Negative for: 

Facial Droop





- Laboratory Results


Result Diagrams: 


 08/09/18 02:47





 08/09/18 02:47


Urine Pregnancy POC: Negative


Urine dip results: Positive for: Blood (patient on menses)





- ECG


ECG: Positive for: Viewed By Me (reviewed by ED attending)


ECG Rhythm: Positive for: Sinus Rhythm


O2 Sat by Pulse Oximetry: 98





- Radiology


X-Ray: Viewed By Me


X-Ray Interpretation: No Acute Disease





- Progress


ED Course And Treament: 


labs, ekg, urine, CT head, IV fluids





WBC elevated; patient states she just finished 5 day course of Prednisone for 

urticarial rash





EXAM: CT Head Without Intravenous Contrast


CLINICAL HISTORY: 38 years old, female; Signs and symptoms; Dizziness; 

Additional info:


Dizziness, numbness


TECHNIQUE: Axial computed tomography images of the head/brain without 

intravenous contrast.


All CT scans at this facility use at least one of these dose optimization 

techniques: automated


exposure control; mA and/or kV adjustment per patient size (includes targeted 

exams where dose is


matched to clinical indication); or iterative reconstruction. Coronal and 

sagittal reformatted images


were created and reviewed.


COMPARISON: CT - HEAD^HEAD_ROUTINE (ADULT) 2009-07-10 17:59


FINDINGS:


Brain: Unremarkable. No hemorrhage. No significant white matter disease. No 

edema.


Ventricles: Unremarkable. No ventriculomegaly.


Bones/joints: Unremarkable. No acute fracture.


Soft tissues: Unremarkable.


Sinuses: Unremarkable as visualized. No acute sinusitis.


Mastoid air cells: Unremarkable as visualized. No mastoid effusion.


IMPRESSION:


Normal head/brain CT.





Case discussed with Dr. Cronin, Neurologist on-call; recommends admit for MRI/MRA


Case discussed with Dr. Mata, medical service on-call, for placement in 

observation telemetry











Disposition





- Clinical Impression


Clinical Impression: 


 Dizziness, Paresthesias








- Disposition


Disposition Time: 05:23


Condition: FAIR

## 2018-08-09 NOTE — CP.PCM.HP
History of Present Illness





- History of Present Illness


History of Present Illness: 





This is a 37 y/o female admitted for numbness of the right side of the face for 

few days followed by tingling and numbness of the right upper extremity. It was 

accompanied by dizziness.


Patient denies having any episode of the same symptoms in the past.


 Denies any headaches. 





Present on Admission





- Present on Admission


Any Indicators Present on Admission: No


History of DVT/PE: No


History of Uncontrolled Diabetes: No


Urinary Catheter: No


Decubitus Ulcer Present: No





Past Patient History





- Infectious Disease


Hx of Infectious Diseases: None





- Past Medical History & Family History


Past Medical History?: Yes





- Past Social History


Alcohol: Social


Drugs: Denies





- CARDIAC


Hx Cardiac Disorders: No





- PULMONARY


Hx Respiratory Disorders: No





- NEUROLOGICAL


Hx Neurological Disorder: No





- HEENT


Hx HEENT Problems: No





- RENAL


Hx Chronic Kidney Disease: No





- ENDOCRINE/METABOLIC


Hx Endocrine Disorders: No





- HEMATOLOGICAL/ONCOLOGICAL


Hx Blood Disorders: No





- INTEGUMENTARY


Hx Dermatological Problems: No





- MUSCULOSKELETAL/RHEUMATOLOGICAL


Hx Musculoskeletal Disorders: No





- GASTROINTESTINAL


Hx Gastrointestinal Disorders: No





- GENITOURINARY/GYNECOLOGICAL


Hx Genitourinary Disorders: No


Other/Comment: Placenta previa x 2





- PSYCHIATRIC


Hx Emotional Abuse: No


Hx Physical Abuse: No


Hx Substance Use: No





- SURGICAL HISTORY


Hx Surgeries: Yes


Hx Arthroscopy: Yes


Hx Orthopedic Surgery: Yes (right knee)


Hx Tubal Ligation: Yes


Other/Comment: laparoscopy





- ANESTHESIA


Hx Anesthesia: Yes


Hx Anesthesia Reactions: No


Hx Malignant Hyperthermia: No





Meds


Home Medications: 


 Home Medication List











 Medication  Instructions  Recorded  Confirmed  Type


 


valACYclovir [Valtrex] 1 gm PO BID #14 tab 08/09/18  Rx











Allergies/Adverse Reactions: 


 Allergies











Allergy/AdvReac Type Severity Reaction Status Date / Time


 


No Known Allergies Allergy   Verified 08/02/18 10:30














Physical Exam





- Eye Exam


Eye Exam: Normal appearance





- ENT Exam


ENT Exam: Mucous Membranes Moist





- Respiratory Exam


Respiratory Exam: Clear to Auscultation Bilateral





- Cardiovascular Exam


Cardiovascular Exam: REGULAR RHYTHM





- GI/Abdominal Exam


GI & Abdominal Exam: Normal Bowel Sounds





- Neurological Exam


Neurological exam: CN II-XII Intact, Oriented x3





- Psychiatric Exam


Psychiatric exam: Normal Mood





Results





- Vital Signs


Recent Vital Signs: 





 Last Vital Signs











Temp  98.2 F   08/09/18 14:18


 


Pulse  78   08/09/18 14:18


 


Resp  15   08/09/18 14:18


 


BP  129/82   08/09/18 14:18


 


Pulse Ox  100   08/09/18 14:18














- Labs


Result Diagrams: 


 08/09/18 02:47





 08/09/18 02:47


Labs: 





 Laboratory Results - last 24 hr











  08/09/18 08/09/18 08/09/18





  02:47 02:47 03:30


 


WBC  15.3 H D  


 


RBC  4.77  


 


Hgb  12.8  


 


Hct  39.4  


 


MCV  82.5  


 


MCH  26.9 L  


 


MCHC  32.6 L  


 


RDW  14.9 H  


 


Plt Count  209  


 


MPV  9.3  


 


Neut % (Auto)  74.5  


 


Lymph % (Auto)  17.9 L  


 


Mono % (Auto)  6.4  


 


Eos % (Auto)  0.4  


 


Baso % (Auto)  0.8  


 


Neut # (Auto)  11.4 H  


 


Lymph # (Auto)  2.7  


 


Mono # (Auto)  1.0 H  


 


Eos # (Auto)  0.1  


 


Baso # (Auto)  0.1  


 


Sodium   138 


 


Potassium   3.5 L 


 


Chloride   103 


 


Carbon Dioxide   25 


 


Anion Gap   14 


 


BUN   11 


 


Creatinine   0.7 


 


Est GFR ( Amer)   > 60 


 


Est GFR (Non-Af Amer)   > 60 


 


Random Glucose   98 


 


Calcium   8.8 


 


Total Bilirubin   0.5 


 


AST   25 


 


ALT   24 


 


Alkaline Phosphatase   86 


 


Total Protein   7.4 


 


Albumin   4.1 


 


Globulin   3.2 


 


Albumin/Globulin Ratio   1.3 


 


Urine Color    Yellow


 


Urine Clarity    Slighty-cloudy


 


Urine pH    6.0


 


Ur Specific Gravity    1.024


 


Urine Protein    30


 


Urine Glucose (UA)    Neg


 


Urine Ketones    Trace


 


Urine Blood    Moderate


 


Urine Nitrate    Negative


 


Urine Bilirubin    Negative


 


Urine Urobilinogen    4.0 H


 


Ur Leukocyte Esterase    Small


 


Urine RBC (Auto)    273 H


 


Urine Microscopic WBC    < 1














Assessment & Plan


(1) Bell's palsy


Status: Acute   





(2) Right facial numbness


Status: Acute   





(3) Dizziness


Status: Acute   





- Assessment and Plan (Free Text)


Plan: 





Musa prince


 neurocheck CT scan/ MRI of [facility] brain


 ASA


may be discharge oonce cleared by Neuro.

## 2018-08-09 NOTE — CARD
--------------- APPROVED REPORT --------------





Date of service: 08/09/2018



<Conclusion>

Sinus rhythm with marked sinus arrhythmia

Otherwise normal ECG

## 2018-08-09 NOTE — CP.PCM.CON
History of Present Illness





- History of Present Illness


History of Present Illness: 





  This is a 38 yr old woman with acute onset dizziness and  mild headache for 

severa days. 





Past Patient History





- Infectious Disease


Hx of Infectious Diseases: None





- Past Medical History & Family History


Past Medical History?: Yes





- Past Social History


Alcohol: Social


Drugs: Denies





- CARDIAC


Hx Cardiac Disorders: No





- PULMONARY


Hx Respiratory Disorders: No





- NEUROLOGICAL


Hx Neurological Disorder: No





- HEENT


Hx HEENT Problems: No





- RENAL


Hx Chronic Kidney Disease: No





- ENDOCRINE/METABOLIC


Hx Endocrine Disorders: No





- HEMATOLOGICAL/ONCOLOGICAL


Hx Blood Disorders: No





- INTEGUMENTARY


Hx Dermatological Problems: No





- MUSCULOSKELETAL/RHEUMATOLOGICAL


Hx Musculoskeletal Disorders: No





- GASTROINTESTINAL


Hx Gastrointestinal Disorders: No





- GENITOURINARY/GYNECOLOGICAL


Hx Genitourinary Disorders: No


Other/Comment: Placenta previa x 2





- PSYCHIATRIC


Hx Emotional Abuse: No


Hx Physical Abuse: No


Hx Substance Use: No





- SURGICAL HISTORY


Hx Surgeries: Yes


Hx Arthroscopy: Yes


Hx Orthopedic Surgery: Yes (right knee)


Hx Tubal Ligation: Yes


Other/Comment: laparoscopy





- ANESTHESIA


Hx Anesthesia: Yes


Hx Anesthesia Reactions: No


Hx Malignant Hyperthermia: No





Meds


Allergies/Adverse Reactions: 


 Allergies











Allergy/AdvReac Type Severity Reaction Status Date / Time


 


No Known Allergies Allergy   Verified 08/02/18 10:30














- Medications


Medications: 


 Current Medications





Aspirin (Aspirin Chewable)  81 mg PO DAILY REYNOLD


   Last Admin: 08/09/18 10:45 Dose:  81 mg











Results





- Vital Signs


Recent Vital Signs: 


 Last Vital Signs











Temp  98.2 F   08/09/18 08:37


 


Pulse  84   08/09/18 12:18


 


Resp  16   08/09/18 12:18


 


BP  133/87   08/09/18 12:18


 


Pulse Ox  98   08/09/18 12:18














- Labs


Result Diagrams: 


 08/09/18 02:47





 08/09/18 02:47


Labs: 


 Laboratory Results - last 24 hr











  08/09/18 08/09/18 08/09/18





  02:47 02:47 03:30


 


WBC  15.3 H D  


 


RBC  4.77  


 


Hgb  12.8  


 


Hct  39.4  


 


MCV  82.5  


 


MCH  26.9 L  


 


MCHC  32.6 L  


 


RDW  14.9 H  


 


Plt Count  209  


 


MPV  9.3  


 


Neut % (Auto)  74.5  


 


Lymph % (Auto)  17.9 L  


 


Mono % (Auto)  6.4  


 


Eos % (Auto)  0.4  


 


Baso % (Auto)  0.8  


 


Neut # (Auto)  11.4 H  


 


Lymph # (Auto)  2.7  


 


Mono # (Auto)  1.0 H  


 


Eos # (Auto)  0.1  


 


Baso # (Auto)  0.1  


 


Sodium   138 


 


Potassium   3.5 L 


 


Chloride   103 


 


Carbon Dioxide   25 


 


Anion Gap   14 


 


BUN   11 


 


Creatinine   0.7 


 


Est GFR ( Amer)   > 60 


 


Est GFR (Non-Af Amer)   > 60 


 


Random Glucose   98 


 


Calcium   8.8 


 


Total Bilirubin   0.5 


 


AST   25 


 


ALT   24 


 


Alkaline Phosphatase   86 


 


Total Protein   7.4 


 


Albumin   4.1 


 


Globulin   3.2 


 


Albumin/Globulin Ratio   1.3 


 


Urine Color    Yellow


 


Urine Clarity    Slighty-cloudy


 


Urine pH    6.0


 


Ur Specific Gravity    1.024


 


Urine Protein    30


 


Urine Glucose (UA)    Neg


 


Urine Ketones    Trace


 


Urine Blood    Moderate


 


Urine Nitrate    Negative


 


Urine Bilirubin    Negative


 


Urine Urobilinogen    4.0 H


 


Ur Leukocyte Esterase    Small


 


Urine RBC (Auto)    273 H


 


Urine Microscopic WBC    < 1

## 2018-08-09 NOTE — CT
Date of service: 



08/09/2018



PROCEDURE:  CT HEAD WITHOUT CONTRAST.



HISTORY:

dizziness, numbness



COMPARISON:

07/10/2009



TECHNIQUE:

Axial computed tomography images were obtained through the head/brain 

without intravenous contrast.  



Radiation dose:



Total exam DLP = 783 mGy-cm.



This CT exam was performed using one or more of the following dose 

reduction techniques: Automated exposure control, adjustment of the 

mA and/or kV according to patient size, and/or use of iterative 

reconstruction technique.



FINDINGS:



HEMORRHAGE:

No intracranial hemorrhage. 



BRAIN:

No mass effect or edema.  No atrophy or chronic microvascular 

ischemic changes.



VENTRICLES:

Unremarkable. No hydrocephalus. 



CALVARIUM:

Unremarkable.



PARANASAL SINUSES:

Unremarkable as visualized. No significant inflammatory changes.



MASTOID AIR CELLS:

Unremarkable as visualized. No inflammatory changes.



OTHER FINDINGS:

The report concurs with the preliminary [facility] report



IMPRESSION:

No acute intracranial finding

## 2018-08-09 NOTE — MRI
Date of service: 



08/09/2018



PROCEDURE:  Magnetic Resonance Angiography Brain



HISTORY:

dizziness, right-sided numbness







COMPARISON:

None available. 



TECHNIQUE:

3D time of flight MR angiography of the intracranial arteries was 

performed. Rotating maximum intensity projection images were 

generated.



FINDINGS:



INTERNAL CAROTID ARTERIES:

Unremarkable. The skull base, petrous, cavernous and supraclinoid 

segments are bilaterally widely patient. 



ANTERIOR CEREBRAL ARTERIES:

Unremarkable. A1 and A2 segments are widely patent. Smaller distal 

branches unremarkable, as visualized.



MIDDLE CEREBRAL ARTERIES:

Unremarkable. M1 and M2 segments are widely patent. Perisylvian 

branches grossly symmetric.



POSTERIOR CIRCULATION:

Basilar Artery: Unremarkable.



Distal Vertebral Arteries: Unremarkable.



Posterior Cerebral Arteries: Unremarkable.



Posterior Inferior Cerebellar Arteries: Unremarkable.



ANEURYSM/ VASCULAR MALFORMATIONS:

None.



OTHER FINDINGS:

None. 



IMPRESSION:

Unremarkable MR angiography of the brain.

## 2018-08-09 NOTE — MRI
Date of service: 



08/09/2018



PROCEDURE:  MRI BRAIN WITHOUT CONTRAST



HISTORY:

dizziness, right-sided numbness



COMPARISON:

Noncontrast head CT 08/09/2018. 



TECHNIQUE:

Multiplanar, multisequence MR images of the brain were obtained 

without intravenous contrast enhancement.



FINDINGS:



HEMORRHAGE:

None



DWI:

No evidence of an acute or early subacute infarction.



BRAIN PARENCHYMA:

Intrinsic signal throughout the gray and white matter structures 

above below the tentorium appears within normal limits including the 

brainstem.  There is no mass effect, parenchymal edema or loss of the 

corticomedullary differentiation. Midline brain anatomy appears 

within normal limits including the corpus callosum, brainstem and 

craniocervical junction. There is no suspicious extra-axial fluid 

collection identified.



VENTRICLES:

Unremarkable. No hydrocephalus.



CRANIUM:

Unremarkable.



ORBITS:

Grossly unremarkable.



PARANASAL SINUSES/MASTOIDS:

Clear



VASCULAR SYSTEM:

Skull base flow voids intact.



OTHER FINDINGS:

None. 



IMPRESSION:

Unremarkable non contrast enhanced MRI of the brain.

## 2018-10-10 ENCOUNTER — HOSPITAL ENCOUNTER (EMERGENCY)
Dept: HOSPITAL 14 - H.ER | Age: 38
Discharge: HOME | End: 2018-10-10
Payer: MEDICAID

## 2018-10-10 VITALS
DIASTOLIC BLOOD PRESSURE: 89 MMHG | HEART RATE: 75 BPM | TEMPERATURE: 98.5 F | SYSTOLIC BLOOD PRESSURE: 138 MMHG | OXYGEN SATURATION: 98 % | RESPIRATION RATE: 16 BRPM

## 2018-10-10 VITALS — BODY MASS INDEX: 29.9 KG/M2
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
## 2018-10-10 DIAGNOSIS — S93.402A: Primary | ICD-10-CM
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
## 2018-10-10 NOTE — ED PDOC
Lower Extremity Pain/Injury


History Per: Patient


Additional Complaint(s): 





Pt. states yesterday she twisted her L ankle and has had progressively worsening

pain to the ankle. Denies numbness, tingling, other injury. 





<Titi Wells - Last Filed: 10/10/18 23:48>





<Amber Alford - Last Filed: 10/12/18 19:38>


Time Seen by Provider: 10/10/18 21:44


Chief Complaint (Nursing): Lower Extremity Problem/Injury





Supervising Attending Note





- Attestation:


I have personally seen and examined this patient.: No


I have reviewed all pertinent clinical information, including history, physical 

exam and plan: Yes





<Amber Alford - Last Filed: 10/12/18 19:38>





Past Medical History


Reviewed: Historical Data, Nursing Documentation, Vital Signs


Vital Signs: 





                                Last Vital Signs











Temp  98.5 F   10/10/18 21:32


 


Pulse  75   10/10/18 21:32


 


Resp  16   10/10/18 21:32


 


BP  138/89   10/10/18 21:32


 


Pulse Ox  98   10/10/18 21:32














- Medical History


PMH: 


   Denies: Chronic Kidney Disease





- Family History


Family History: States: Unknown Family Hx





<Titi Wells - Last Filed: 10/10/18 23:48>


Vital Signs: 





                                Last Vital Signs











Temp  98.5 F   10/10/18 21:32


 


Pulse  75   10/10/18 21:32


 


Resp  16   10/10/18 21:32


 


BP  138/89   10/10/18 21:32


 


Pulse Ox  98   10/10/18 23:50














<Amber Alford - Last Filed: 10/12/18 19:38>





- Home Medications


Home Medications: 


                                Ambulatory Orders











 Medication  Instructions  Recorded


 


DiphenhydrAMINE [Benadryl] 50 mg PO TID PRN #30 cap 08/02/18


 


Famotidine [Pepcid] 40 mg PO DAILY #20 tablet 08/02/18


 


valACYclovir [Valtrex] 1 gm PO BID #14 tab 08/09/18














- Allergies


Allergies/Adverse Reactions: 


                                    Allergies











Allergy/AdvReac Type Severity Reaction Status Date / Time


 


No Known Allergies Allergy   Verified 10/10/18 21:32














Review of Systems


ROS Statement: Except As Marked, All Systems Reviewed And Found Negative





<Titi Wells - Last Filed: 10/10/18 23:48>





Physical Exam





- Physical Exam


Appears: Positive for: Well, Non-toxic, No Acute Distress


Skin: Positive for: Normal Color, Warm.  Negative for: Rash


Eye Exam: Positive for: Normal appearance


Pulses-Dorsalis Pedis (L): 2+


Pulses-Dorsalis Pedis (R): 2+


Extremity: Positive for: Other (L ankle: mild tenderness on L lateral malleolus 

on inferior area without swelling or deformity; no foot, leg, or knee 

tenderness)


Neurologic/Psych: Positive for: Alert, Oriented (x3)





<DaniaconnorTiti - Last Filed: 10/10/18 23:48>





- ECG


O2 Sat by Pulse Oximetry: 98





- Radiology


X-Ray: Interpreted by Me (L ankle xray)


X-Ray Interpretation: No Acute Disease





- Progress


ED Course And Treament: 





Ankle immobilized in ankle aircast splint applied by RN. Crutches provided. 





<RussellTiti NEWTON - Last Filed: 10/10/18 23:48>





Disposition





- Patient ED Disposition


Is Patient to be Admitted: No





- Disposition


Disposition: Routine/Home


Disposition Time: 23:09





<Titi Wells - Last Filed: 10/10/18 23:48>





<Amber Alford - Last Filed: 10/12/18 19:38>





- Clinical Impression


Clinical Impression: 


 Ankle sprain








- Disposition


Referrals: 


[facility] [Outside]


Jules Messina MD [Staff Provider] - 


Ric Mendoza MD [Staff Provider] - 


Condition: IMPROVED


Additional Instructions: 





KIM NOBLE, thank you for letting us take care of you today. Your provider 

was Amber Alford MD and you were treated for LT ANKLE PAIN. The emergency 

medical care you received today was directed at your acute symptoms. If you were

prescribed any medication, please fill it and take as directed. It may take 

several days for your symptoms to resolve. Return to the Emergency Department if

your symptoms worsen, do not improve, or if you have any other problems.





Please contact your doctor or call one of the physicians/clinics you have been 

referred to that are listed on the Patient Visit Information form that is 

included in your discharge packet. Bring any paperwork you were given at 

discharge with you along with any medications you are taking to your follow up 

visit. Our treatment cannot replace ongoing medical care by a primary care 

provider outside of the emergency department.





Thank you for allowing the [facility] ([facility] team to be part of your care today.








If you had an X-Ray or CT scan: A Radiologist will review the ED reading if any 

change in treatment is needed we will contact you.***





If you had a blood, urine, or wound culture: It will take several days for the 

results, if any change in treatment is needed we will contact you.***





If you had an STI test: It will take 48 hours for the results. Please call after

1 week if you have not heard back.***


Instructions:  Ankle Sprain (DC)


Forms:  [employer] (English)


Print Language: ENGLISH

## 2018-10-11 NOTE — RAD
Date of service: 



10/10/2018



PROCEDURE:  Left Ankle Radiographs.



HISTORY:

 trauma 



COMPARISON:

None



FINDINGS:



BONES:

Normal. No fracture. 



JOINTS:

Normal. No osteoarthritis. Ankle mortise maintained. Talar dome intact



SOFT TISSUES:

Normal. 



OTHER FINDINGS:

None.



IMPRESSION:

Normal left ankle radiographs.

## 2018-12-12 ENCOUNTER — HOSPITAL ENCOUNTER (EMERGENCY)
Dept: HOSPITAL 14 - H.ER | Age: 38
Discharge: HOME | End: 2018-12-12
Payer: MEDICAID

## 2018-12-12 VITALS
TEMPERATURE: 98.8 F | DIASTOLIC BLOOD PRESSURE: 88 MMHG | SYSTOLIC BLOOD PRESSURE: 118 MMHG | HEART RATE: 87 BPM | RESPIRATION RATE: 14 BRPM

## 2018-12-12 VITALS — BODY MASS INDEX: 29.9 KG/M2

## 2018-12-12 DIAGNOSIS — J11.1: Primary | ICD-10-CM

## 2018-12-12 NOTE — ED PDOC
History of Present Illness


History of Present Illness: 


Caitlin Pruitt is a 38 year old female with a past medical history of 

hypertension and TIA in July who is presenting to the ED for evaluation of 

cough, nasal congestion, and fevers onset yesterday. Patient states that this 

morning her fever was 101 and took ibuprofen. She also admits to having a sore 

throat with painful swallowing onset yesterday. Patient denies any sick 

contacts, nausea, vomiting, diarrhea, ear pain, or dysuria. 





PMD: Edenilson Bauer








HPI: Influenza


Time Seen by Provider: 12/12/18 19:05


Chief Complaint: Cough, Cold, Congestion


Chief Complaint (Provider): Cough, Cold, Congestion


History Per: Patient


Exam Limitations: no limitations


Onset/Duration Of Symptoms: Days


Symptoms include: fever, sore throat, cough, nasal congestion.  denies: 

vomiting, diarrhea





Past Medical History


Reviewed: Historical Data, Nursing Documentation, Vital Signs


Vital Signs: 


                                Last Vital Signs











Temp  98.7 F   12/12/18 19:00


 


Pulse  83   12/12/18 19:00


 


Resp  18   12/12/18 19:00


 


BP  123/78   12/12/18 19:00


 


Pulse Ox  100   12/12/18 19:00














- Medical History


PMH: HTN, TIA


   Denies: Chronic Kidney Disease





- Surgical History


Other surgeries: orthopedic surgery, tubal ligation





- Family History


Family History: States: Unknown Family Hx





- Social History


Current smoker - smoking cessation education provided: No


Alcohol: None


Drugs: Denies





- Home Medications


Home Medications: 


                                Ambulatory Orders











 Medication  Instructions  Recorded


 


DiphenhydrAMINE [Benadryl] 50 mg PO TID PRN #30 cap 08/02/18


 


Famotidine [Pepcid] 40 mg PO DAILY #20 tablet 08/02/18


 


valACYclovir [Valtrex] 1 gm PO BID #14 tab 08/09/18


 


Acetaminophen [Tylenol] 650 mg PO Q6H PRN 5 Days  capsule 12/12/18


 


Fluticasone Propionate [Flonase] 1 spr BETZY BID PRN 5 Days  bottle 12/12/18


 


Ibuprofen [Motrin Tab] 600 mg PO Q6 PRN 5 Days  tab 12/12/18


 


Promethazine DM [Phenergan DM 5 ml PO Q8 PRN 5 Days  cup 12/12/18





Syrup]  














- Allergies


Allergies/Adverse Reactions: 


                                    Allergies











Allergy/AdvReac Type Severity Reaction Status Date / Time


 


No Known Allergies Allergy   Verified 12/12/18 19:00














Review of Systems


ROS Statement: Except As Marked, All Systems Reviewed And Found Negative


Constitutional: Positive for: Fever


ENT: Positive for: Nose Congestion, Throat Pain.  Negative for: Ear Pain


Respiratory: Positive for: Cough


Gastrointestinal: Negative for: Nausea, Vomiting, Diarrhea


Genitourinary Female: Negative for: Dysuria





Physical Exam





- Reviewed


Nursing Documentation Reviewed: Yes


Vital Signs Reviewed: Yes





- Physical Exam


Appears: Positive for: Non-toxic, No Acute Distress, Uncomfortable


Head Exam: Positive for: ATRAUMATIC, NORMAL INSPECTION, NORMOCEPHALIC


Skin: Positive for: Normal Color, Warm, DRY


Eye Exam: Positive for: Normal appearance.  Negative for: Conjunctival injection


ENT: Positive for: TM Is/Are (normal bilaterally ), Pharyngeal Erythema 

(tonsillar erythema ), Other (No pain to palpation of maxillary or frontal 

sinuses, (+) swollen nasal turbinates ).  Negative for: Tonsillar Exudate


Cardiovascular/Chest: Positive for: Regular Rate, Rhythm.  Negative for: Murmur


Respiratory: Positive for: Normal Breath Sounds.  Negative for: Respiratory 

Distress


Neurologic/Psych: Positive for: Alert, Oriented.  Negative for: Motor/Sensory 

Deficits





Medical Decision Making


Medical Decision Making: 


Time: 20:29


Plan:


--ED urine pregnancy


--Mono


--Rapid Flu


--Rapid strep





------------------------------------------------------------------

-------------------------------


Scribe Attestation:


Documented by Tangela Carmona, acting as a scribe for Lynette Ziegler PA-C. 





Provider Scribe Attestation:


All medical record entries made by the Scribe were at my direction and 

personally dictated by me. I have reviewed the chart and agree that the record 

accurately reflects my personal performance of the history, physical exam, 

medical decision making, and the department course for this patient. I have also

personally directed, reviewed, and agree with the discharge instructions and 

disposition.











- ECG


O2 Sat by Pulse Oximetry: 100





Disposition





- Clinical Impression


Clinical Impression: 


 Influenza-like illness








- Patient ED Disposition


Is Patient to be Admitted: No





- Disposition


Referrals: 


Matthew Ortiz MD [Family Provider] - 


Disposition: Routine/Home


Disposition Time: 21:25


Condition: STABLE


Additional Instructions: 


Take Tylenol or Ibuprofen for fevers/body aches. Return to ER if you develop 

shortness of breath or persistent fevers. 


Prescriptions: 


Acetaminophen [Tylenol] 650 mg PO Q6H PRN 5 Days  capsule


 PRN Reason: Fever >100.4 F


Fluticasone Propionate [Flonase] 1 spr BETZY BID PRN 5 Days  bottle


 PRN Reason: Nasal Congestion


Ibuprofen [Motrin Tab] 600 mg PO Q6 PRN 5 Days  tab


 PRN Reason: Pain, Moderate (4-7)


Promethazine DM [Phenergan DM Syrup] 5 ml PO Q8 PRN 5 Days  cup


 PRN Reason: Cough


Instructions:  Viral Syndrome (DC)


Forms:  [facility] (English)


Print Language: ENGLISH

## 2018-12-13 VITALS — OXYGEN SATURATION: 100 %

## 2018-12-28 ENCOUNTER — HOSPITAL ENCOUNTER (EMERGENCY)
Dept: HOSPITAL 14 - H.ER | Age: 38
Discharge: ANOTHER HOSPITAL | End: 2018-12-28
Payer: MEDICAID

## 2018-12-28 VITALS
TEMPERATURE: 98.2 F | DIASTOLIC BLOOD PRESSURE: 80 MMHG | HEART RATE: 69 BPM | OXYGEN SATURATION: 99 % | SYSTOLIC BLOOD PRESSURE: 117 MMHG

## 2018-12-28 VITALS — BODY MASS INDEX: 29.9 KG/M2

## 2018-12-28 VITALS — RESPIRATION RATE: 18 BRPM

## 2018-12-28 DIAGNOSIS — N39.0: Primary | ICD-10-CM

## 2018-12-28 NOTE — ED PDOC
HPI: Female  Pain


Time Seen by Provider: 12/28/18 15:23


Chief Complaint (Nursing): Female Genitourinary


Chief Complaint (Provider): Female Genitourinary


History Per: Patient


History/Exam Limitations: no limitations


Onset/Duration Of Symptoms: Days


Current Symptoms Are (Timing): Still Present


Quality Of Discomfort: Burning


Associated Symptoms: Urinary Symptoms


Additional Complaint(s): 


37 y/o female with a PMHx of HTN presents to the ED for evaluation of vaginal 

discharge, onset yesterday. Patient states discharge is white/yellow in color 

and associated with dysuria and urinary frequency. Patient additionally 

describes discomfort as itchy. Patient states she is sexually active and does 

not use protection. Otherwise, patient denies any possibility of pregnancy, 

redness, vaginal bleeding, abdominal pain, back pain, nausea, vomiting, diarrhea

and shortness of breath. 





PMD: Edenilson Bauer





Past Medical History


Reviewed: Historical Data, Nursing Documentation, Vital Signs


Vital Signs: 





                                Last Vital Signs











Temp  97.8 F   12/28/18 15:03


 


Pulse  66   12/28/18 15:03


 


Resp  18   12/28/18 15:03


 


BP  117/79   12/28/18 15:03


 


Pulse Ox  100   12/28/18 15:03














- Medical History


PMH: HTN, TIA


   Denies: Chronic Kidney Disease





- Surgical History


Surgical History: No Surg Hx





- Family History


Family History: States: Unknown Family Hx





- Home Medications


Home Medications: 


                                Ambulatory Orders











 Medication  Instructions  Recorded


 


DiphenhydrAMINE [Benadryl] 50 mg PO TID PRN #30 cap 08/02/18


 


Famotidine [Pepcid] 40 mg PO DAILY #20 tablet 08/02/18


 


valACYclovir [Valtrex] 1 gm PO BID #14 tab 08/09/18


 


Acetaminophen [Tylenol] 650 mg PO Q6H PRN 5 Days  capsule 12/12/18


 


Fluticasone Propionate [Flonase] 1 spr BETZY BID PRN 5 Days  bottle 12/12/18


 


Ibuprofen [Motrin Tab] 600 mg PO Q6 PRN 5 Days  tab 12/12/18


 


Promethazine DM [Phenergan DM 5 ml PO Q8 PRN 5 Days  cup 12/12/18





Syrup]  


 


Nitrofurantoin Macrocrystals 100 mg PO BID #10 cap 12/28/18





[Macrobid]  














- Allergies


Allergies/Adverse Reactions: 


                                    Allergies











Allergy/AdvReac Type Severity Reaction Status Date / Time


 


No Known Allergies Allergy   Verified 12/28/18 15:03














Review of Systems


ROS Statement: Except As Marked, All Systems Reviewed And Found Negative


Respiratory: Negative for: Shortness of Breath


Gastrointestinal: Negative for: Nausea, Vomiting, Abdominal Pain, Diarrhea


Genitourinary Female: Positive for: Dysuria, Frequency, Vaginal Discharge.  

Negative for: Vaginal Bleeding, Rash


Musculoskeletal: Negative for: Back Pain





Physical Exam





- Reviewed


Nursing Documentation Reviewed: Yes


Vital Signs Reviewed: Yes





- Physical Exam


Appears: Positive for: No Acute Distress


Head Exam: Positive for: ATRAUMATIC, NORMOCEPHALIC


Skin: Positive for: Normal Color, Warm, Dry


Eye Exam: Positive for: Normal appearance


Neck: Positive for: Normal


Cardiovascular/Chest: Positive for: Regular Rate, Rhythm.  Negative for: Murmur


Respiratory: Positive for: Normal Breath Sounds.  Negative for: Respiratory 

Distress


Gastrointestinal/Abdominal: Positive for: Normal Exam, Soft.  Negative for: 

Tenderness


Pelvic Exam: Positive for: External Exam Normal, No Cerv. Motion Tender, No 

Masses, Discharge (trace whitish dc).  Negative for: Active Bleeding, 

Cervicitis, Lesions, Tender W/Cervical Motion


Back: Positive for: Normal Inspection.  Negative for: L CVA Tenderness, R CVA 

Tenderness


Extremity: Positive for: Normal ROM


Neurologic/Psych: Positive for: Alert, Oriented.  Negative for: Motor/Sensory 

Deficits





- ECG


O2 Sat by Pulse Oximetry: 100 (RA)


Pulse Ox Interpretation: Normal





- Progress


ED Course And Treament: 





1552:  Stable.  AAOx3.  Pain free.  Tolerated PO.  Will tx for uti.  

GC/Chlamydia cx sent.  





Medical Decision Making


Medical Decision Making: 


Time: 1531


Plan:


-- ED Urine Pregnancy


-- ED Urine Dipstick


-- Chlamydia/GC RNA, TMA








 

________________________________________________________________________________


__________


Scribe Attestation:


Documented by Cydney Gustafson, acting as a scribe for Tyrese Phipps MD.





Provider Scribe Attestation:


All medical record entries made by the Scribe were at my direction and 

personally dictated by me. I have reviewed the chart and agree that the record 

accurately reflects my personal performance of the history, physical exam, 

medical decision making, and the department course for this patient. I have also

 personally directed, reviewed, and agree with the discharge instructions and 

disposition.





Disposition





- Clinical Impression


Clinical Impression: 


 UTI (urinary tract infection)








- Patient ED Disposition


Is Patient to be Admitted: No


Counseled Patient/Family Regarding: Studies Performed, Diagnosis, Need For 

Followup, Rx Given





- Disposition


[facility]errals[facility] 


[facility]


Disposition: Routine/Home


Disposition Time: 15:54


Condition: STABLE


Additional Instructions: 


Return if not better in 3 days. 


Prescriptions: 


Nitrofurantoin Macrocrystals [Macrobid] 100 mg PO BID #10 cap


Instructions:  Urinary Tract Infection, Adult (DC)


Forms:  [facility] (English), [facility] ED School/Work Excuse

## 2019-02-06 ENCOUNTER — HOSPITAL ENCOUNTER (EMERGENCY)
Dept: HOSPITAL 14 - H.ER | Age: 39
Discharge: HOME | End: 2019-02-06
Payer: MEDICAID

## 2019-02-06 VITALS — SYSTOLIC BLOOD PRESSURE: 125 MMHG | DIASTOLIC BLOOD PRESSURE: 83 MMHG | RESPIRATION RATE: 18 BRPM

## 2019-02-06 VITALS — OXYGEN SATURATION: 100 % | TEMPERATURE: 98.7 F

## 2019-02-06 VITALS — BODY MASS INDEX: 29.9 KG/M2

## 2019-02-06 DIAGNOSIS — R07.89: Primary | ICD-10-CM

## 2019-02-06 DIAGNOSIS — Z86.73: ICD-10-CM

## 2019-02-06 DIAGNOSIS — I10: ICD-10-CM

## 2019-02-06 LAB
ALBUMIN SERPL-MCNC: 4.6 G/DL (ref 3.5–5)
ALBUMIN/GLOB SERPL: 1.2 {RATIO} (ref 1–2.1)
ALT SERPL-CCNC: 14 U/L (ref 9–52)
APTT BLD: 31.3 SECONDS (ref 25.6–37.1)
AST SERPL-CCNC: 26 U/L (ref 14–36)
BACTERIA #/AREA URNS HPF: (no result) /[HPF]
BASOPHILS # BLD AUTO: 0.1 K/UL (ref 0–0.2)
BASOPHILS NFR BLD: 0.8 % (ref 0–2)
BILIRUB UR-MCNC: NEGATIVE MG/DL
BUN SERPL-MCNC: 13 MG/DL (ref 7–17)
CALCIUM SERPL-MCNC: 9.6 MG/DL (ref 8.4–10.2)
COLOR UR: YELLOW
D DIMER: 211 NG/MLDDU (ref 0–230)
EOSINOPHIL # BLD AUTO: 0.1 K/UL (ref 0–0.7)
EOSINOPHIL NFR BLD: 1 % (ref 0–4)
ERYTHROCYTE [DISTWIDTH] IN BLOOD BY AUTOMATED COUNT: 13.7 % (ref 11.5–14.5)
GFR NON-AFRICAN AMERICAN: > 60
GLUCOSE UR STRIP-MCNC: (no result) MG/DL
HGB BLD-MCNC: 13.5 G/DL (ref 12–16)
INR PPP: 1
LEUKOCYTE ESTERASE UR-ACNC: (no result) LEU/UL
LYMPHOCYTES # BLD AUTO: 2.4 K/UL (ref 1–4.3)
LYMPHOCYTES NFR BLD AUTO: 23.5 % (ref 20–40)
MCH RBC QN AUTO: 26.4 PG (ref 27–31)
MCHC RBC AUTO-ENTMCNC: 32.2 G/DL (ref 33–37)
MCV RBC AUTO: 82 FL (ref 81–99)
MONOCYTES # BLD: 0.6 K/UL (ref 0–0.8)
MONOCYTES NFR BLD: 6.2 % (ref 0–10)
NEUTROPHILS # BLD: 7 K/UL (ref 1.8–7)
NEUTROPHILS NFR BLD AUTO: 68.5 % (ref 50–75)
NRBC BLD AUTO-RTO: 0.1 % (ref 0–0)
PH UR STRIP: 7 [PH] (ref 5–8)
PLATELET # BLD: 201 K/UL (ref 130–400)
PMV BLD AUTO: 9.2 FL (ref 7.2–11.7)
PROT UR STRIP-MCNC: NEGATIVE MG/DL
PROTHROMBIN TIME: 11.3 SECONDS (ref 9.8–13.1)
RBC # BLD AUTO: 5.1 MIL/UL (ref 3.8–5.2)
RBC # UR STRIP: NEGATIVE /UL
SP GR UR STRIP: 1.02 (ref 1–1.03)
SQUAMOUS EPITHIAL: 7 /HPF (ref 0–5)
URINE CLARITY: (no result)
UROBILINOGEN UR-MCNC: (no result) MG/DL (ref 0.2–1)
WBC # BLD AUTO: 10.3 K/UL (ref 4.8–10.8)

## 2019-02-06 NOTE — ED PDOC
HPI: Chest Pain


Time Seen by Provider: 19 15:51


Chief Complaint (Nursing): Chest Pain


Chief Complaint (Provider): Chest Pain


History Per: Patient


History/Exam Limitations: no limitations


Onset/Duration Of Symptoms: Days (x 2), Intermittent Episodes


Current Symptoms Are (Timing): Still Present


Quality: "Pain"


Exacerbating Factors: Deep Breathing


Additional Complaint(s): 


38 year old female with a history of hypertension presents to the ED for 

evaluation of right sided, mid-sternal chest pain radiating around her right 

breast intermittently for the last two days. Patient also complains of 

dizziness. Pain is exacerbated when she takes a deep breath. Denies shortness of

breath, palpitations, anti coagulant use, recent travel, back pain or other 

complaints. 





PMD: Dr. Edenilson Bauer








- Risk Factors


TAD Risk Factors: Pos: Hypertension





Past Medical History


Reviewed: Historical Data, Nursing Documentation, Vital Signs


Vital Signs: 





                                Last Vital Signs











Temp  98.7 F   19 15:22


 


Pulse  80   19 15:22


 


Resp  16   19 15:22


 


BP  147/92 H  19 15:22


 


Pulse Ox  100   19 15:22














- Medical History


PMH: HTN, TIA


   Denies: Chronic Kidney Disease





- Surgical History


Surgical History: No Surg Hx





- Family History


Family History: States: Unknown Family Hx





- Social History


Current smoker - smoking cessation education provided: No


Ex-Smoker (has not smoked in the last 12 months): No





- Home Medications


Home Medications: 


                                Ambulatory Orders











 Medication  Instructions  Recorded


 


DiphenhydrAMINE [Benadryl] 50 mg PO TID PRN #30 cap 18


 


Famotidine [Pepcid] 40 mg PO DAILY #20 tablet 18


 


valACYclovir [Valtrex] 1 gm PO BID #14 tab 18


 


Acetaminophen [Tylenol] 650 mg PO Q6H PRN 5 Days  capsule 18


 


Fluticasone Propionate [Flonase] 1 spr BETZY BID PRN 5 Days  bottle 18


 


Ibuprofen [Motrin Tab] 600 mg PO Q6 PRN 5 Days  tab 18


 


Promethazine DM [Phenergan DM 5 ml PO Q8 PRN 5 Days  cup 18





Syrup]  


 


Nitrofurantoin Macrocrystals 100 mg PO BID #10 cap 18





[Macrobid]  


 


Naproxen [Naprosyn] 500 mg PO BID PRN #15 tablet 19














- Allergies


Allergies/Adverse Reactions: 


                                    Allergies











Allergy/AdvReac Type Severity Reaction Status Date / Time


 


No Known Allergies Allergy   Verified 19 15:22














Wells Criteria for PE





- Wells Criteria for Pulmonary Embolism


Clinical Signs and Symptoms of DVT: No


P.E is #1 Diagnosis, or Equally Likely: No


Heart Rate >100: No


Immobilization at least 3 days;Surgery previous 4 weeks: No


Previous, objectively diagnosed PE or DVT: No


Hemoptysis: No


Malignancy w/treatment within 6 months, or palliative: No


Total Score: 0





Review of Systems


ROS Statement: Except As Marked, All Systems Reviewed And Found Negative


Cardiovascular: Positive for: Chest Pain (right sided midsternal; radiating 

around right breast).  Negative for: Palpitations


Respiratory: Negative for: Cough, Shortness of Breath


Musculoskeletal: Negative for: Neck Pain, Back Pain


Neurological: Positive for: Dizziness





Physical Exam





- Reviewed


Nursing Documentation Reviewed: Yes


Vital Signs Reviewed: Yes





- Physical Exam


Appears: Positive for: Non-toxic, No Acute Distress


Head Exam: Positive for: ATRAUMATIC, NORMAL INSPECTION, NORMOCEPHALIC


Skin: Positive for: Normal Color, Warm, Dry


Eye Exam: Positive for: EOMI, Normal appearance, PERRL


ENT: Positive for: Normal ENT Inspection


Neck: Positive for: Normal, Painless ROM, Supple


Cardiovascular/Chest: Positive for: Regular Rate, Rhythm.  Negative for: Murmur


Respiratory: Positive for: Normal Breath Sounds.  Negative for: Respiratory 

Distress


Gastrointestinal/Abdominal: Positive for: Normal Exam, Soft.  Negative for: 

Tenderness


Back: Positive for: Normal Inspection.  Negative for: L CVA Tenderness, R CVA 

Tenderness


Extremity: Positive for: Normal ROM (x 4).  Negative for: Calf Tenderness, 

Deformity, Swelling


Neurologic/Psych: Positive for: Alert, Oriented (x 3).  Negative for: 

Motor/Sensory Deficits





- Laboratory Results


Result Diagrams: 


                                 19 17:15





                                 19 17:15





- ECG


ECG: Positive for: Interpreted By Me, Viewed By Me


ECG Rhythm: Positive for: Sinus Rhythm (normal).  Negative for: ST/T Changes


Rate: 68


O2 Sat by Pulse Oximetry: 100 (RA)


Pulse Ox Interpretation: Normal





Medical Decision Making


Medical Decision Makin:43 


Impression: right sided chest pain


Initial Plan: 


--EKG


--CMP 


--CBC 


--CXR


--Urine preg


--Urine dip 


--PTT


--PT


--D Dimer 





Accession No. : [id number]


Patient Name / ID : REAL ALVAREZ  / [id number]


Exam Date : 2019 16:50:38 ( Approved )


Study Comment : 


Sex / Age : F  / 38Y





Creator : 


Dictator : Minesh Nagel MD


 : 


Approver : Minesh Nagel MD


Approver2 : 





Report Date : 


My Comment : 


**********************************************

*************************************





Date of service: 





2019





HISTORY:


 R sided CP 





COMPARISON:


2018





TECHNIQUE:


Chest PA and lateral





FINDINGS:





LUNGS:


No active pulmonary disease.





PLEURA:


No significant pleural effusion identified. No pneumothorax apparent.





CARDIOVASCULAR:


No aortic atherosclerotic calcification present.





Normal cardiac size. No pulmonary vascular congestion. 





OSSEOUS STRUCTURES:


No significant abnormalities.





VISUALIZED UPPER ABDOMEN:


Normal.





OTHER FINDINGS:


None.





IMPRESSION:


No active disease. No significant interval change compared to the prior examina

tion(s).





 

--------------------------------------------------------------------------------


-----------------


Scribe Attestation:


Documented by Sharon Ragland, acting as a scribe for Amber Alford MD 





Provider Scribe Attestation:


All medical record entries made by the Scribe were at my direction and 

personally dictated by me. I have reviewed the chart and agree that the record 

accurately reflects my personal performance of the history, physical exam, 

medical decision making, and the department course for this patient. I have also

 personally directed, reviewed, and agree with the discharge instructions and 

disposition.





Disposition





- Clinical Impression


Clinical Impression: 


 Right-sided chest pain








- Disposition


Disposition Time: 18:27


Condition: STABLE


Additional Instructions: 


FOLLOW-UP WITH PMD WITHIN 2 DAYS FOR REEVALUATION. 


Prescriptions: 


Naproxen [Naprosyn] 500 mg PO BID PRN #15 tablet


 PRN Reason: Pain, Moderate (4-7)


Instructions:  Chest Pain


Forms:  [facility] (English)

## 2019-02-07 NOTE — RAD
Date of service: 



02/06/2019



HISTORY:

 R sided CP 



COMPARISON:

08/09/2018



TECHNIQUE:

Chest PA and lateral



FINDINGS:



LUNGS:

No active pulmonary disease.



PLEURA:

No significant pleural effusion identified. No pneumothorax apparent.



CARDIOVASCULAR:

No aortic atherosclerotic calcification present.



Normal cardiac size. No pulmonary vascular congestion. 



OSSEOUS STRUCTURES:

No significant abnormalities.



VISUALIZED UPPER ABDOMEN:

Normal.



OTHER FINDINGS:

None.



IMPRESSION:

No active disease. No significant interval change compared to the 

prior examination(s).

## 2019-02-07 NOTE — CARD
--------------- APPROVED REPORT --------------





Date of service: 02/06/2019



EKG Measurement

Heart [id number]

[id number]

[id number]

[id number]

[id number]



<Conclusion>

Normal sinus rhythm

Normal ECG

## 2019-02-07 NOTE — CARD
--------------- APPROVED REPORT --------------





Date of service: 02/06/2019



EKG Measurement

Heart [id number]

[id number]

[id number]

[id number]

[id number]



<Conclusion>

Normal sinus rhythm

Normal ECG

## 2019-02-10 VITALS — HEART RATE: 68 BPM
